# Patient Record
Sex: FEMALE | Race: WHITE
[De-identification: names, ages, dates, MRNs, and addresses within clinical notes are randomized per-mention and may not be internally consistent; named-entity substitution may affect disease eponyms.]

---

## 2018-06-17 ENCOUNTER — HOSPITAL ENCOUNTER (EMERGENCY)
Dept: HOSPITAL 54 - ER | Age: 68
Discharge: HOME | End: 2018-06-17
Payer: MEDICARE

## 2018-06-17 VITALS — BODY MASS INDEX: 29.35 KG/M2 | HEIGHT: 67 IN | WEIGHT: 187 LBS

## 2018-06-17 VITALS — SYSTOLIC BLOOD PRESSURE: 138 MMHG | DIASTOLIC BLOOD PRESSURE: 79 MMHG
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
## 2018-06-17 DIAGNOSIS — I10: ICD-10-CM

## 2018-06-17 DIAGNOSIS — S42.212A: Primary | ICD-10-CM
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
## 2018-06-17 DIAGNOSIS — E11.9: ICD-10-CM

## 2018-06-17 PROCEDURE — 96374 THER/PROPH/DIAG INJ IV PUSH: CPT

## 2018-06-17 PROCEDURE — 99284 EMERGENCY DEPT VISIT MOD MDM: CPT

## 2018-06-17 PROCEDURE — A4606 OXYGEN PROBE USED W OXIMETER: HCPCS

## 2018-06-17 PROCEDURE — 73060 X-RAY EXAM OF HUMERUS: CPT

## 2018-06-17 PROCEDURE — Z7610: HCPCS

## 2018-06-17 PROCEDURE — A6402 STERILE GAUZE <= 16 SQ IN: HCPCS

## 2018-06-17 PROCEDURE — 73030 X-RAY EXAM OF SHOULDER: CPT

## 2018-06-17 PROCEDURE — 71045 X-RAY EXAM CHEST 1 VIEW: CPT

## 2018-06-17 PROCEDURE — 73090 X-RAY EXAM OF FOREARM: CPT

## 2018-06-17 PROCEDURE — 73070 X-RAY EXAM OF ELBOW: CPT

## 2018-06-17 NOTE — NUR
[id number] FROM HOME: LEFT SHOULDER PAIN S/P 4' GLF, MORPHINE 8, ZOFRAN 4 GIVEN IN 
FIELD BY EMS, NAD NOTED, VSS, RESP EVEN AND UNLABORED, PT WAS PUT ON  MONITOR, 
MD AT BS.